# Patient Record
Sex: MALE | Race: OTHER | Employment: FULL TIME | URBAN - METROPOLITAN AREA
[De-identification: names, ages, dates, MRNs, and addresses within clinical notes are randomized per-mention and may not be internally consistent; named-entity substitution may affect disease eponyms.]

---

## 2018-03-26 ENCOUNTER — HOSPITAL ENCOUNTER (OUTPATIENT)
Age: 53
Discharge: HOME | End: 2018-03-26
Payer: COMMERCIAL

## 2018-03-26 LAB
C-PEPTIDE: 2.1 NG/ML (ref 1.1–4.4)
GLUCOSE FASTING: 160 MG/DL (ref 70–99)

## 2018-03-26 PROCEDURE — 84681 ASSAY OF C-PEPTIDE: CPT

## 2018-03-26 PROCEDURE — 82947 ASSAY GLUCOSE BLOOD QUANT: CPT

## 2018-03-26 PROCEDURE — 86341 ISLET CELL ANTIBODY: CPT

## 2018-03-26 PROCEDURE — 36415 COLL VENOUS BLD VENIPUNCTURE: CPT

## 2018-03-28 LAB — ISLET CELL ANTIBODY: NORMAL

## 2024-01-22 ENCOUNTER — HOSPITAL ENCOUNTER (OUTPATIENT)
Age: 59
Setting detail: SPECIMEN
Discharge: HOME | End: 2024-01-22

## 2024-01-22 LAB
BASOPHILS # BLD: 0.07 K/UL (ref 0–0.2)
BASOPHILS NFR BLD: 1 % (ref 0–2)
EOSINOPHIL # BLD: 0.07 K/UL (ref 0–0.44)
EOSINOPHILS RELATIVE PERCENT: 1 % (ref 1–4)
ERYTHROCYTE [DISTWIDTH] IN BLOOD BY AUTOMATED COUNT: 12.6 % (ref 11.8–14.4)
HCT VFR BLD AUTO: 42.1 % (ref 40.7–50.3)
HGB BLD-MCNC: 13.4 G/DL (ref 13–17)
IMM GRANULOCYTES # BLD AUTO: <0.03 K/UL (ref 0–0.3)
IMM GRANULOCYTES NFR BLD: 0 %
LYMPHOCYTES NFR BLD: 2.58 K/UL (ref 1.1–3.7)
LYMPHOCYTES RELATIVE PERCENT: 30 % (ref 24–43)
MCH RBC QN AUTO: 28.4 PG (ref 25.2–33.5)
MCHC RBC AUTO-ENTMCNC: 31.8 G/DL (ref 28.4–34.8)
MCV RBC AUTO: 89.2 FL (ref 82.6–102.9)
MONOCYTES NFR BLD: 0.69 K/UL (ref 0.1–1.2)
MONOCYTES NFR BLD: 8 % (ref 3–12)
NEUTROPHILS NFR BLD: 60 % (ref 36–65)
NEUTS SEG NFR BLD: 5.32 K/UL (ref 1.5–8.1)
NRBC BLD-RTO: 0 PER 100 WBC
PLATELET # BLD AUTO: 286 K/UL (ref 138–453)
PMV BLD AUTO: 10.8 FL (ref 8.1–13.5)
PSA SERPL-MCNC: 1.28 NG/ML
RBC # BLD AUTO: 4.72 M/UL (ref 4.21–5.77)
WBC OTHER # BLD: 8.8 K/UL (ref 3.5–11.3)

## 2024-01-23 LAB
ALBUMIN SERPL-MCNC: 4.5 G/DL (ref 3.5–5.2)
ALBUMIN/GLOB SERPL: 1.5 {RATIO} (ref 1–2.5)
ALP SERPL-CCNC: 97 U/L (ref 40–129)
ALT SERPL-CCNC: 29 U/L (ref 5–41)
ANION GAP SERPL CALCULATED.3IONS-SCNC: 13 MMOL/L (ref 9–17)
AST SERPL-CCNC: 21 U/L
BILIRUB SERPL-MCNC: 0.3 MG/DL (ref 0.3–1.2)
BUN SERPL-MCNC: 17 MG/DL (ref 6–20)
CALCIUM SERPL-MCNC: 9 MG/DL (ref 8.6–10.4)
CHLORIDE SERPL-SCNC: 102 MMOL/L (ref 98–107)
CHOLEST SERPL-MCNC: 129 MG/DL
CHOLESTEROL/HDL RATIO: 3.1
CO2 SERPL-SCNC: 24 MMOL/L (ref 20–31)
CREAT SERPL-MCNC: 0.8 MG/DL (ref 0.7–1.2)
GFR SERPL CREATININE-BSD FRML MDRD: >60 ML/MIN/1.73M2
GLUCOSE SERPL-MCNC: 96 MG/DL (ref 70–99)
HDLC SERPL-MCNC: 41 MG/DL
LDLC SERPL CALC-MCNC: 63 MG/DL (ref 0–130)
POTASSIUM SERPL-SCNC: 4.3 MMOL/L (ref 3.7–5.3)
PROT SERPL-MCNC: 7.5 G/DL (ref 6.4–8.3)
SODIUM SERPL-SCNC: 139 MMOL/L (ref 135–144)
T4 FREE SERPL-MCNC: 1.2 NG/DL (ref 0.9–1.7)
TRIGL SERPL-MCNC: 125 MG/DL
TSH SERPL DL<=0.05 MIU/L-ACNC: 3.57 UIU/ML (ref 0.3–5)
URATE SERPL-MCNC: 5.2 MG/DL (ref 3.4–7)

## 2024-12-02 ENCOUNTER — HOSPITAL ENCOUNTER (OUTPATIENT)
Dept: PHYSICAL THERAPY | Facility: CLINIC | Age: 59
Setting detail: THERAPIES SERIES
Discharge: HOME | End: 2024-12-02
Payer: COMMERCIAL

## 2024-12-02 PROCEDURE — 97110 THERAPEUTIC EXERCISES: CPT

## 2024-12-02 PROCEDURE — 97161 PT EVAL LOW COMPLEX 20 MIN: CPT

## 2024-12-02 PROCEDURE — 97016 VASOPNEUMATIC DEVICE THERAPY: CPT

## 2024-12-02 NOTE — CONSULTS
Billable time 50 min 3        Time in: 8:10 am    Time Out: 9:00 am    Electronically signed by: Manfred Cortez PT        Physician Signature:________________________________Date:__________________  By signing above or cosigning this note, I have reviewed this plan of care and certify a need for medically necessary rehabilitation services.     *PLEASE SIGN ABOVE AND FAX BACK ALL PAGES*

## 2024-12-05 ENCOUNTER — HOSPITAL ENCOUNTER (OUTPATIENT)
Dept: PHYSICAL THERAPY | Facility: CLINIC | Age: 59
Setting detail: THERAPIES SERIES
Discharge: HOME | End: 2024-12-05
Payer: COMMERCIAL

## 2024-12-05 PROCEDURE — 97110 THERAPEUTIC EXERCISES: CPT

## 2024-12-05 NOTE — FLOWSHEET NOTE
[] [facility]  Outpatient Rehabilitation &  Therapy  [address]  [phone]  [phone] [] [facility]  Outpatient Rehabilitation &  Therapy  [address]  P: [phone]  F: [phone] [] [facility]  Outpatient Rehabilitation &  Therapy  [address]  P: [phone]  F: [phone] [x] [facility]  Outpatient Rehabilitation &  Therapy  [address]  [phone]  [phone] [] [facility]  Outpatient Rehabilitation &  Therapy  [address]   P: [phone]  F: [phone]  [] [facility]  Outpatient Rehabilitation &  Therapy  [address]  P: [phone]  F: [phone] [] [facility]  Outpatient Rehabilitation &  Therapy  [address]  P: [phone]  F: [phone] [] [facility]  Outpatient Rehabilitation &  Therapy  [address]  P: [phone]  F: [phone] [] [facility]  Outpatient Rehabilitation &  Therapy  [address]  P: [phone]  F: [phone]  [] [facility] Outpatient Rehabilitation &  Therapy  [address]  P: [phone]  F: [phone]     Physical Therapy Daily Treatment Note    Date:  2024  Patient Name:  Ramon Go    :  1965  MRN: [id number]  Physician: Dominick Harvey MD  Insurance: [facility] Shared Services; Jacob yr; 60/60vs; no auth req; hard max; PT/OT/ST comb; 15% coins; ded met; 6500/3608.50 remaining OOP   Medical Diagnosis: Bilateral shoulder pain, unspecified chronicity [M25.511, M25.512], Rotator cuff impingement syndrome, unspecified laterality [M75.40]                         Rehab Codes: M25. 511, M25.512   Visit# / total visits:    Cancels/No Shows: 0/0    Subjective:  Patient states his shoulders are feeling a little better since taking

## 2024-12-09 ENCOUNTER — HOSPITAL ENCOUNTER (OUTPATIENT)
Dept: PHYSICAL THERAPY | Facility: CLINIC | Age: 59
Setting detail: THERAPIES SERIES
Discharge: HOME | End: 2024-12-09
Payer: COMMERCIAL

## 2024-12-09 PROCEDURE — 97110 THERAPEUTIC EXERCISES: CPT

## 2024-12-09 PROCEDURE — 97016 VASOPNEUMATIC DEVICE THERAPY: CPT

## 2024-12-09 NOTE — FLOWSHEET NOTE
tolerated     STG: (to be met in 8 treatments)  ? Pain: Pt will report resting pain <6/10 for improved QOL.  ? ROM: Pt will improve flexion ROM to 150 to improve overhead reaching with ADLs.   ? Strength: Pt will improve strength to at least 4/5 to improve strength for recreational activities.   ? Function: Pt will improve UEFI by at least 4 points   Patient to be independent with home exercise program as demonstrated by performance with correct form without cues.  LTG: (to be met in 12 treatments)  Pt will improve ROM to 160 to improve overhead reaching with ADLs.   Pt will report pain <6/10 with previously painful movements.   Pt will improve UEFI by at least 9 points to improve QOL.                 Patient goals: \"relieve pain\"    Pt. Education:  [x] Yes  [] No  [x] Reviewed Prior HEP/Ed  Method of Education: [x] Verbal  [x] Demo  [x] Written  Comprehension of Education:  [x] Verbalizes understanding.  [x] Demonstrates understanding.  [x] Needs review.  [] Demonstrates/verbalizes HEP/Ed previously given.     Access Code: [email or web address]  URL: [email or web address]  Date: 12/09/2024  Prepared by: Manfred Cortez    Exercises  - Seated Scapular Retraction  - 1 x daily - 5 x weekly - 2 sets - 10 reps  - Sidelying Shoulder External Rotation  - 1 x daily - 5 x weekly - 2 sets - 10 reps  - Sidelying Shoulder Abduction Palm Forward  - 1 x daily - 5 x weekly - 2 sets - 10 reps  - Supine Shoulder Flexion Extension AAROM with Dowel  - 1 x daily - 5 x weekly - 2 sets - 10 reps  - Standing Shoulder Flexion Wall Walk  - 1 x daily - 5 x weekly - 2 sets - 10 reps  - Standing Shoulder Row with Anchored Resistance  - 1 x daily - 5 x weekly - 2 sets - 10 reps  - Shoulder extension with resistance - Neutral  - 1 x daily - 5 x weekly - 2 sets - 10 reps     Plan: [x] Continue current frequency toward long and short term goals.    [x] Specific Instructions for subsequent treatments: review HEP, progress ROM and strength as

## 2024-12-12 ENCOUNTER — HOSPITAL ENCOUNTER (OUTPATIENT)
Dept: PHYSICAL THERAPY | Facility: CLINIC | Age: 59
Setting detail: THERAPIES SERIES
Discharge: HOME | End: 2024-12-12
Payer: COMMERCIAL

## 2024-12-12 PROCEDURE — 97110 THERAPEUTIC EXERCISES: CPT

## 2024-12-12 NOTE — FLOWSHEET NOTE
QOL.  ? ROM: Pt will improve flexion ROM to 150 to improve overhead reaching with ADLs.   ? Strength: Pt will improve strength to at least 4/5 to improve strength for recreational activities.   ? Function: Pt will improve UEFI by at least 4 points   Patient to be independent with home exercise program as demonstrated by performance with correct form without cues.  LTG: (to be met in 12 treatments)  Pt will improve ROM to 160 to improve overhead reaching with ADLs.   Pt will report pain <6/10 with previously painful movements.   Pt will improve UEFI by at least 9 points to improve QOL.                 Patient goals: \"relieve pain\"    Pt. Education:  [x] Yes  [] No  [x] Reviewed Prior HEP/Ed  Method of Education: [x] Verbal  [x] Demo  [x] Written  Comprehension of Education:  [x] Verbalizes understanding.  [x] Demonstrates understanding.  [x] Needs review.  [] Demonstrates/verbalizes HEP/Ed previously given.     Access Code: [email or web address]  URL: [email or web address]  Date: 12/09/2024  Prepared by: Manfred Machado-    Exercises  - Seated Scapular Retraction  - 1 x daily - 5 x weekly - 2 sets - 10 reps  - Sidelying Shoulder External Rotation  - 1 x daily - 5 x weekly - 2 sets - 10 reps  - Sidelying Shoulder Abduction Palm Forward  - 1 x daily - 5 x weekly - 2 sets - 10 reps  - Supine Shoulder Flexion Extension AAROM with Dowel  - 1 x daily - 5 x weekly - 2 sets - 10 reps  - Standing Shoulder Flexion Wall Walk  - 1 x daily - 5 x weekly - 2 sets - 10 reps  - Standing Shoulder Row with Anchored Resistance  - 1 x daily - 5 x weekly - 2 sets - 10 reps  - Shoulder extension with resistance - Neutral  - 1 x daily - 5 x weekly - 2 sets - 10 reps     Plan: [x] Continue current frequency toward long and short term goals.    [x] Specific Instructions for subsequent treatments: update HEP      Frequency: 2 x/week for 12 visits     Time In: 11:55 am             Time Out: 1:00 pm    Electronically signed by:  Manfred

## 2024-12-16 ENCOUNTER — HOSPITAL ENCOUNTER (OUTPATIENT)
Dept: PHYSICAL THERAPY | Facility: CLINIC | Age: 59
Setting detail: THERAPIES SERIES
Discharge: HOME | End: 2024-12-16
Payer: COMMERCIAL

## 2024-12-16 PROCEDURE — 97110 THERAPEUTIC EXERCISES: CPT

## 2024-12-16 NOTE — FLOWSHEET NOTE
[x] Yes  [] No Location: bilateral shoulder  Pain Rating: (0-10 scale) /10    4/10 right  3/10 left   Pain altered Tx:  [x] No  [] Yes  Action:  Comments:     Objective:  Modalities:   Precautions [x] No  [] Yes:  Exercises:  Exercise Reps/ Time Weight/ Level Comments Completed    UBE 2x2'   x   SL ER 2x10     x   SL abduction  2x10     x   SL horzional abd  2x10   x   SL flexion  2x10   -          Pulleys  2x10    Flexion/Scaption  -          Seated scap retraction 2x10        Towel on wall  30x ea    up/down, L/R, circles -   Bwd shoulder rolls  10x      Ball up wall  10x ea  Fwd/V x   SL open book 15x   x   A, T 10x  Pain with T - withhold  x   Y lift at wall  10x2   x   Tband rows  10x2 Blue    x   Tband shoulder extension  10x2 Blue     x   Tband IR  10x Orange   With towel roll  -   TB ER iso hold  [id number]\"  Orange loop  -     Other:    Treatment Charges: Mins Units   []  Modalities     [x]  Ther Exercise 55 4   []  Neuromuscular Re-ed     []  Gait Training     []  Manual Therapy     []  Ther Activities     []  Aquatics     []  Vasocompression     []  Cervical Traction     []  Other     Total Billable time 55 4      Assessment: [x] Progressing toward goals. Pt has good tolerance with exercises listed. Added in SL open book rotation after subjective report of neck/upper back stiffness. Updated HEP as listed with blue TB provided. Pt denied need for vaso at end of session.     [] No change.     [] Other:   [x] Patient would continue to benefit from skilled physical therapy services in order to: review HEP, progress ROM and strength as tolerated     STG: (to be met in 8 treatments)  ? Pain: Pt will report resting pain <6/10 for improved QOL.  ? ROM: Pt will improve flexion ROM to 150 to improve overhead reaching with ADLs.   ? Strength: Pt will improve strength to at least 4/5 to improve strength for recreational activities.   ? Function: Pt will improve UEFI by at least 4 points   Patient to be independent with

## 2024-12-19 ENCOUNTER — APPOINTMENT (OUTPATIENT)
Dept: PHYSICAL THERAPY | Facility: CLINIC | Age: 59
End: 2024-12-19
Payer: COMMERCIAL

## 2024-12-20 ENCOUNTER — HOSPITAL ENCOUNTER (OUTPATIENT)
Dept: PHYSICAL THERAPY | Facility: CLINIC | Age: 59
Setting detail: THERAPIES SERIES
Discharge: HOME | End: 2024-12-20
Payer: COMMERCIAL

## 2024-12-20 PROCEDURE — 97110 THERAPEUTIC EXERCISES: CPT

## 2024-12-20 NOTE — FLOWSHEET NOTE
[] [facility]  Outpatient Rehabilitation &  Therapy  [address]  [phone]  [phone] [] [facility]  Outpatient Rehabilitation &  Therapy  [address]  P: [phone]  F: [phone] [] [facility]  Outpatient Rehabilitation &  Therapy  [address]  P: [phone]  F: [phone] [x] [facility]  Outpatient Rehabilitation &  Therapy  [address]  [phone]  [phone] [] [facility]  Outpatient Rehabilitation &  Therapy  [address]   P: [phone]  F: [phone]  [] [facility]  Outpatient Rehabilitation &  Therapy  [address]  P: [phone]  F: [phone] [] [facility]  Outpatient Rehabilitation &  Therapy  [address]  P: [phone]  F: [phone] [] [facility]  Outpatient Rehabilitation &  Therapy  [address]  P: [phone]  F: [phone] [] [facility]  Outpatient Rehabilitation &  Therapy  [address]  P: [phone]  F: [phone]  [] [facility] Outpatient Rehabilitation &  Therapy  [address]  P: [phone]  F: [phone]     Physical Therapy Daily Treatment Note    Date:  2024  Patient Name:  Ramon Go    :  1965  MRN: [id number]  Physician: Dominick Harvey MD  Insurance: [facility] Shared Services; Jacob yr; 60/60vs; no auth req; hard max; PT/OT/ST comb; 15% coins; ded met; 6500/3608.50 remaining OOP   Medical Diagnosis: Bilateral shoulder pain, unspecified chronicity [M25.511, M25.512], Rotator cuff impingement syndrome, unspecified laterality [M75.40]                         Rehab Codes: M25. 511, M25.512   Visit# / total visits:    Cancels/No Shows: 0/0    Subjective:  Pt states his shoulders are feeling good today - was lifting heavy

## 2024-12-23 ENCOUNTER — HOSPITAL ENCOUNTER (OUTPATIENT)
Dept: PHYSICAL THERAPY | Facility: CLINIC | Age: 59
Setting detail: THERAPIES SERIES
Discharge: HOME | End: 2024-12-23
Payer: COMMERCIAL

## 2024-12-23 PROCEDURE — 97110 THERAPEUTIC EXERCISES: CPT

## 2024-12-23 NOTE — FLOWSHEET NOTE
Instructions for subsequent treatments: update HEP      Frequency: 2 x/week for 12 visits     Time In:  10:05 am   (pt arrival time)      Time Out: 10:58 am    Electronically signed by:  Manfred Cortez PT     
alert

## 2024-12-26 ENCOUNTER — HOSPITAL ENCOUNTER (OUTPATIENT)
Dept: PHYSICAL THERAPY | Facility: CLINIC | Age: 59
Setting detail: THERAPIES SERIES
Discharge: HOME | End: 2024-12-26
Payer: COMMERCIAL

## 2024-12-26 PROCEDURE — 97110 THERAPEUTIC EXERCISES: CPT

## 2024-12-26 PROCEDURE — 97140 MANUAL THERAPY 1/> REGIONS: CPT

## 2024-12-26 NOTE — FLOWSHEET NOTE
[] [facility]  Outpatient Rehabilitation &  Therapy  [address]  [phone]  [phone] [] [facility]  Outpatient Rehabilitation &  Therapy  [address]  P: [phone]  F: [phone] [] [facility]  Outpatient Rehabilitation &  Therapy  [address]  P: [phone]  F: [phone] [x] [facility]  Outpatient Rehabilitation &  Therapy  [address]  [phone]  [phone] [] [facility]  Outpatient Rehabilitation &  Therapy  [address]   P: [phone]  F: [phone]  [] [facility]  Outpatient Rehabilitation &  Therapy  [address]  P: [phone]  F: [phone] [] [facility]  Outpatient Rehabilitation &  Therapy  [address]  P: [phone]  F: [phone] [] [facility]  Outpatient Rehabilitation &  Therapy  [address]  P: [phone]  F: [phone] [] [facility]  Outpatient Rehabilitation &  Therapy  [address]  P: [phone]  F: [phone]  [] [facility] Outpatient Rehabilitation &  Therapy  [address]  P: [phone]  F: [phone]     Physical Therapy Daily Treatment Note    Date:  2024  Patient Name:  Ramon Go    :  1965  MRN: [id number]  Physician: Dominick Harvey MD  Insurance: [facility] Shared Services; Jacob yr; 60/60vs; no auth req; hard max; PT/OT/ST comb; 15% coins; ded met; 6500/3608.50 remaining OOP   Medical Diagnosis: Bilateral shoulder pain, unspecified chronicity [M25.511, M25.512], Rotator cuff impingement syndrome, unspecified laterality [M75.40]                         Rehab Codes: M25. 511, M25.512   Visit# / total visits:     Cancels/No Shows: 0/0    Subjective:  Pt reports still sleeping better - otherwise pain is maintaining in

## 2024-12-30 ENCOUNTER — HOSPITAL ENCOUNTER (OUTPATIENT)
Dept: PHYSICAL THERAPY | Facility: CLINIC | Age: 59
Setting detail: THERAPIES SERIES
Discharge: HOME | End: 2024-12-30
Payer: COMMERCIAL

## 2024-12-30 PROCEDURE — 97110 THERAPEUTIC EXERCISES: CPT

## 2024-12-30 NOTE — FLOWSHEET NOTE
to lift him off the floor - feeling increased soreness in his shoulders today.    Pain:  [x] Yes  [] No Location: bilateral shoulder  Pain Rating: (0-10 scale) /10    4/10 right  3/10 left   Pain altered Tx:  [x] No  [] Yes  Action:  Comments:     Objective:  Modalities:   Precautions [x] No  [] Yes:  Exercises:  Exercise Reps/ Time Weight/ Level Comments Completed    UBE 2x2'  NC x   SL ER x10 1#   -   SL abduction  x10 1#   -   SL horzional abd  x10 1#  -   SL flexion  2x10   -          Pulleys  2x10    Flexion/Scaption  -   [place]  1x10 Blue   -   Seated scap retraction 2x10        Towel on wall  30x ea    up/down, L/R, circles -   Standing ER  2x10 lime  x   Ball up wall  10x ea  Soccer ball -fwd/V x   Star  2x10 Blue   -   SL open book 15x   -   A 10x2 1#  -   Y lift at wall  10x2   -   Tband rows  2x15 Blue    x   Tband shoulder extension  2x15 Blue     x   TB ER iso hold  10x10\"  lime loop  x   Abduction/flexion  2x10 2#  x   Tricep extension  2x15 Lime   x     Other:    UEFI: 59/80 - previous 61/80  MMT: 4/5 abduction/flexion/ER/IR for R/L     Objective:               ROM  °A/P END FEEL STRENGTH     Left Right   Left Right   Sit Shld Flex 130/140 135/145   4+* 4+*   Sit Shld Abd 150 140   4+* 4+*   Sit Shld IR T12 L5         Shoulder Flex             Ext             ABD             ER        4* 4*   IR       4* 4*       Treatment Charges: Mins Units   []  Modalities     [x]  Ther Exercise 38 3   []  Neuromuscular Re-ed     []  Gait Training     []  Manual Therapy     []  Ther Activities     []  Aquatics     []  Vasocompression     []  Cervical Traction     []  Other     Total Billable time 38 3      Assessment: [] Progressing toward goals.     [] No change.     [x] Other: Pt has fair-good tolerance with listed exercises - increased soreness noted this date secondary to heavy lifting this past weekend. Updated goals with pt demonstrating min objective improvement. Education provided to pt regarding plan

## 2025-01-02 ENCOUNTER — HOSPITAL ENCOUNTER (OUTPATIENT)
Dept: PHYSICAL THERAPY | Facility: CLINIC | Age: 60
Setting detail: THERAPIES SERIES
Discharge: HOME | End: 2025-01-02
Payer: COMMERCIAL

## 2025-01-02 PROCEDURE — 97110 THERAPEUTIC EXERCISES: CPT

## 2025-01-02 NOTE — FLOWSHEET NOTE
[] [facility]  Outpatient Rehabilitation &  Therapy  [address]  [phone]  [phone] [] [facility]  Outpatient Rehabilitation &  Therapy  [address]  P: [phone]  F: [phone] [] [facility]  Outpatient Rehabilitation &  Therapy  [address]  P: [phone]  F: [phone] [x] [facility]  Outpatient Rehabilitation &  Therapy  [address]  [phone]  [phone] [] [facility]  Outpatient Rehabilitation &  Therapy  [address]   P: [phone]  F: [phone]  [] [facility]  Outpatient Rehabilitation &  Therapy  [address]  P: [phone]  F: [phone] [] [facility]  Outpatient Rehabilitation &  Therapy  [address]  P: [phone]  F: [phone] [] [facility]  Outpatient Rehabilitation &  Therapy  [address]  P: [phone]  F: [phone] [] [facility]  Outpatient Rehabilitation &  Therapy  [address]  P: [phone]  F: [phone]  [] [facility] Outpatient Rehabilitation &  Therapy  [address]  P: [phone]  F: [phone]     Physical Therapy Daily Treatment Note    Date:  2025  Patient Name:  Ramon Go    :  1965  MRN: [id number]  Physician: Dominick Harvey MD  Insurance: [facility] Shared Services; Jacob yr; 60/60vs; no auth req; hard max; PT/OT/ST comb; 15% coins; ded met; 6500/3608.50 remaining OOP   Medical Diagnosis: Bilateral shoulder pain, unspecified chronicity [M25.511, M25.512], Rotator cuff impingement syndrome, unspecified laterality [M75.40]                         Rehab Codes: M25. 511, M25.512   Visit# / total visits: 10/12    Cancels/No Shows: 0/0    Subjective:  Pt states he has an appointment with ortho tomorrow at 9:30 am -states

## 2025-01-03 ENCOUNTER — OFFICE VISIT (OUTPATIENT)
Dept: ORTHOPEDIC SURGERY | Age: 60
End: 2025-01-03

## 2025-01-03 VITALS — HEIGHT: 68 IN | WEIGHT: 222 LBS | RESPIRATION RATE: 14 BRPM | BODY MASS INDEX: 33.65 KG/M2

## 2025-01-03 DIAGNOSIS — M75.82 TENDINITIS OF BOTH ROTATOR CUFFS: Primary | ICD-10-CM

## 2025-01-03 DIAGNOSIS — M25.512 BILATERAL SHOULDER PAIN, UNSPECIFIED CHRONICITY: ICD-10-CM

## 2025-01-03 DIAGNOSIS — M75.81 TENDINITIS OF BOTH ROTATOR CUFFS: Primary | ICD-10-CM

## 2025-01-03 DIAGNOSIS — M25.511 BILATERAL SHOULDER PAIN, UNSPECIFIED CHRONICITY: ICD-10-CM

## 2025-01-03 DIAGNOSIS — M75.21 BILATERAL BICEPS TENDINITIS: ICD-10-CM

## 2025-01-03 DIAGNOSIS — M75.22 BILATERAL BICEPS TENDINITIS: ICD-10-CM

## 2025-01-03 RX ORDER — DICLOFENAC SODIUM 75 MG/1
75 TABLET, DELAYED RELEASE ORAL 2 TIMES DAILY WITH MEALS
Qty: 28 TABLET | Refills: 0 | Status: SHIPPED | OUTPATIENT
Start: 2025-01-03 | End: 2025-01-17

## 2025-01-03 NOTE — PROGRESS NOTES
Orthopedic Shoulder Encounter Note     Chief complaint: bilateral shoulder pain    HPI: Ramon Go is a 59 y.o.  right-hand dominant male who presents for evaluation of both his shoulders which have been hurting for the past 3 to 4 months.  No precipitating trauma or injury.  He essentially woke up with soreness in his shoulders.  The right hurts more than the left.  He initially thought that his pain was due to having slept in an awkward position but it has persisted.  He is unable to raise the right arm above shoulder level without being in pain.  He describes having a constant toothache like pain in his shoulder with or without activity.  He also describes having easy fatigability.  He has been treated thus far with some physical therapy and states that the shoulder is usually feel better for a day after therapy.  He is also had cortisone injections administered by his PCP providing pain relief lasting about a week.    Previous treatment:    NSAIDs: None    Physical Therapy: Yes, 10 visits    Injections: Bilateral shoulder cortisone injections.  Right shoulder 11/11/2024 and left shoulder 11/18/2024 by his PCP    Surgeries: None    Review of Systems:   Constitutional: Negative for fever, chills, sweats.   Pain Score:   4  Neurological: Negative for headache, numbness, or weakness.   Musculoskeletal: As noted in HPI     Past Medical History  Ramon  has no past medical history on file.    Past Surgical History  Ramon  has no past surgical history on file.    Current Medications  No current outpatient medications on file.     No current facility-administered medications for this visit.       Allergies  Allergies have been reviewed.  Ramon has No Known Allergies.    Social History  Ramon  reports that he has never smoked. He has never used smokeless tobacco. He reports that he does not currently use alcohol. He reports that he does not use drugs.    Family History  Ramon's family history is not on file.

## 2025-01-06 ENCOUNTER — APPOINTMENT (OUTPATIENT)
Dept: PHYSICAL THERAPY | Facility: CLINIC | Age: 60
End: 2025-01-06
Payer: COMMERCIAL

## 2025-01-08 ENCOUNTER — HOSPITAL ENCOUNTER (OUTPATIENT)
Dept: INTERVENTIONAL RADIOLOGY/VASCULAR | Age: 60
Discharge: HOME | End: 2025-01-10
Payer: COMMERCIAL

## 2025-01-08 DIAGNOSIS — M75.21 BILATERAL BICEPS TENDINITIS: ICD-10-CM

## 2025-01-08 DIAGNOSIS — M75.22 BILATERAL BICEPS TENDINITIS: ICD-10-CM

## 2025-01-08 DIAGNOSIS — M75.21 BICIPITAL TENDINITIS OF RIGHT SHOULDER: ICD-10-CM

## 2025-01-08 DIAGNOSIS — M75.82 TENDINITIS OF BOTH ROTATOR CUFFS: ICD-10-CM

## 2025-01-08 DIAGNOSIS — M75.81 TENDINITIS OF BOTH ROTATOR CUFFS: ICD-10-CM

## 2025-01-08 PROCEDURE — 20550 NJX 1 TENDON SHEATH/LIGAMENT: CPT

## 2025-01-08 PROCEDURE — 6360000002 HC RX W HCPCS: Performed by: ORTHOPAEDIC SURGERY

## 2025-01-08 PROCEDURE — 2709999900 IR ARTHR/ASP/INJ INT JT/BURSA W US

## 2025-01-08 RX ORDER — LIDOCAINE HYDROCHLORIDE 10 MG/ML
2 INJECTION, SOLUTION EPIDURAL; INFILTRATION; INTRACAUDAL; PERINEURAL ONCE
Status: COMPLETED | OUTPATIENT
Start: 2025-01-08 | End: 2025-01-08

## 2025-01-08 RX ORDER — TRIAMCINOLONE ACETONIDE 40 MG/ML
40 INJECTION, SUSPENSION INTRA-ARTICULAR; INTRAMUSCULAR ONCE
Status: COMPLETED | OUTPATIENT
Start: 2025-01-08 | End: 2025-01-08

## 2025-01-08 RX ADMIN — LIDOCAINE HYDROCHLORIDE 2 ML: 10 INJECTION, SOLUTION EPIDURAL; INFILTRATION; INTRACAUDAL; PERINEURAL at 15:31

## 2025-01-08 RX ADMIN — LIDOCAINE HYDROCHLORIDE 2 ML: 10 INJECTION, SOLUTION EPIDURAL; INFILTRATION; INTRACAUDAL; PERINEURAL at 15:38

## 2025-01-08 RX ADMIN — TRIAMCINOLONE ACETONIDE 40 MG: 40 INJECTION, SUSPENSION INTRA-ARTICULAR; INTRAMUSCULAR at 15:31

## 2025-01-08 RX ADMIN — TRIAMCINOLONE ACETONIDE 40 MG: 40 INJECTION, SUSPENSION INTRA-ARTICULAR; INTRAMUSCULAR at 15:38

## 2025-01-08 NOTE — PROGRESS NOTES
Patient tolerated bilateral biceps tendon sheath injection without distress. Dressing to site. Discharge instructions given, no questions at this time. Patient discharged home via private auto.

## 2025-01-09 ENCOUNTER — APPOINTMENT (OUTPATIENT)
Dept: PHYSICAL THERAPY | Facility: CLINIC | Age: 60
End: 2025-01-09
Payer: COMMERCIAL

## 2025-01-16 ENCOUNTER — HOSPITAL ENCOUNTER (OUTPATIENT)
Dept: PHYSICAL THERAPY | Facility: CLINIC | Age: 60
Setting detail: THERAPIES SERIES
Discharge: HOME | End: 2025-01-16
Payer: COMMERCIAL

## 2025-01-16 PROCEDURE — 97110 THERAPEUTIC EXERCISES: CPT

## 2025-01-16 PROCEDURE — 97016 VASOPNEUMATIC DEVICE THERAPY: CPT

## 2025-01-16 NOTE — FLOWSHEET NOTE
[] [facility]  Outpatient Rehabilitation &  Therapy  [address]  [phone]  [phone] [] [facility]  Outpatient Rehabilitation &  Therapy  [address]  P: [phone]  F: [phone] [] [facility]  Outpatient Rehabilitation &  Therapy  [address]  P: [phone]  F: [phone] [x] [facility]  Outpatient Rehabilitation &  Therapy  [address]  [phone]  [phone] [] [facility]  Outpatient Rehabilitation &  Therapy  [address]   P: [phone]  F: [phone]  [] [facility]  Outpatient Rehabilitation &  Therapy  [address]  P: [phone]  F: [phone] [] [facility]  Outpatient Rehabilitation &  Therapy  [address]  P: [phone]  F: [phone] [] [facility]  Outpatient Rehabilitation &  Therapy  [address]  P: [phone]  F: [phone] [] [facility]  Outpatient Rehabilitation &  Therapy  [address]  P: [phone]  F: [phone]  [] [facility] Outpatient Rehabilitation &  Therapy  [address]  P: [phone]  F: [phone]     Physical Therapy Daily Treatment Note    Date:  2025  Patient Name:  Ramon Go    :  1965  MRN: [id number]  Physician: Dominick Harvey MD  Insurance: : [facility] Shared Services; Jacob yr; 60/60vs; no auth req; hard max; PT/OT/ST comb; 15% coins; 450/0 met ded; 6500/0 met OOP   Medical Diagnosis: Bilateral shoulder pain, unspecified chronicity [M25.511, M25.512], Rotator cuff impingement syndrome, unspecified laterality [M75.40]                         Rehab Codes: M25. 511, M25.512   Visit# / total visits: 10/18   Cancels/No Shows: 0/0    Subjective: Pt states he saw dr. Overton and was told to continue with therapy at

## 2025-01-20 ENCOUNTER — HOSPITAL ENCOUNTER (OUTPATIENT)
Dept: PHYSICAL THERAPY | Facility: CLINIC | Age: 60
Setting detail: THERAPIES SERIES
Discharge: HOME | End: 2025-01-20
Payer: COMMERCIAL

## 2025-01-20 NOTE — FLOWSHEET NOTE
[] [facility]  Outpatient Rehabilitation &  Therapy  [address]  [phone]  [phone] [] [facility]  Outpatient Rehabilitation &  Therapy  [address]  P: [phone]  F: [phone] [] [facility]  Outpatient Rehabilitation &  Therapy  [address]  P: [phone]  F: [phone] [] [facility]  Outpatient Rehabilitation &  Therapy  [address]  [phone]  [phone] [] [facility]  Outpatient Rehabilitation &  Therapy  [address]   P: [phone]  F: [phone]  [] [facility]  Outpatient Rehabilitation &  Therapy  [address]  P: [phone]  F: [phone] [] [facility]  Outpatient Rehabilitation &  Therapy  [address]  P: [phone]  F: [phone] [] [facility]  Outpatient Rehabilitation &  Therapy  [address]  P: [phone]  F: [phone] [] [facility]  Outpatient Rehabilitation &  Therapy  [address]  P: [phone]  F: [phone]  [] [facility] Outpatient Rehabilitation &  Therapy  [address]  P: [phone]  F: [phone]     Therapy Cancel/No Show note    Date: 2025  Patient: Ramon Go  : 1965  MRN: [id number]    Cancels/No Shows to date: 0/0    For today's appointment patient:    [x]  Cancelled    [] Rescheduled appointment    [] No-show     Reason given by patient:    [x]  Patient ill    []  Conflicting appointment    [] No transportation      [] Conflict with work    [] No reason given    [] Weather related    [] COVID-19    [] Other:      Comments:        [x] Next appointment was confirmed    Electronically signed by: Jonathan Brooks PTA

## 2025-01-23 ENCOUNTER — HOSPITAL ENCOUNTER (OUTPATIENT)
Dept: PHYSICAL THERAPY | Facility: CLINIC | Age: 60
Setting detail: THERAPIES SERIES
Discharge: HOME | End: 2025-01-23
Payer: COMMERCIAL

## 2025-01-23 PROCEDURE — 97110 THERAPEUTIC EXERCISES: CPT

## 2025-01-23 NOTE — FLOWSHEET NOTE
[] [facility]  Outpatient Rehabilitation &  Therapy  [address]  [phone]  [phone] [] [facility]  Outpatient Rehabilitation &  Therapy  [address]  P: [phone]  F: [phone] [] [facility]  Outpatient Rehabilitation &  Therapy  [address]  P: [phone]  F: [phone] [x] [facility]  Outpatient Rehabilitation &  Therapy  [address]  [phone]  [phone] [] [facility]  Outpatient Rehabilitation &  Therapy  [address]   P: [phone]  F: [phone]  [] [facility]  Outpatient Rehabilitation &  Therapy  [address]  P: [phone]  F: [phone] [] [facility]  Outpatient Rehabilitation &  Therapy  [address]  P: [phone]  F: [phone] [] [facility]  Outpatient Rehabilitation &  Therapy  [address]  P: [phone]  F: [phone] [] [facility]  Outpatient Rehabilitation &  Therapy  [address]  P: [phone]  F: [phone]  [] [facility] Outpatient Rehabilitation &  Therapy  [address]  P: [phone]  F: [phone]     Physical Therapy Daily Treatment Note    Date:  2025  Patient Name:  Ramon Go    :  1965  MRN: [id number]  Physician: Dominick Harvey MD  Insurance: : [facility] [employer]; Jacob yr; 60/60vs; no auth req; hard max; PT/OT/ST comb; 15% coins; 450/0 met ded; 6500/0 met OOP   Medical Diagnosis: Bilateral shoulder pain, unspecified chronicity [M25.511, M25.512], Rotator cuff impingement syndrome, unspecified laterality [M75.40]                         Rehab Codes: M25. 511, M25.512   Visit# / total visits:    Cancels/No Shows: 0/0    Subjective: Pt states no pain, feeling better from being sick.   Pain:  [] Yes  [x]

## 2025-01-27 ENCOUNTER — HOSPITAL ENCOUNTER (OUTPATIENT)
Dept: PHYSICAL THERAPY | Facility: CLINIC | Age: 60
Setting detail: THERAPIES SERIES
Discharge: HOME | End: 2025-01-27
Payer: COMMERCIAL

## 2025-01-27 PROCEDURE — 97110 THERAPEUTIC EXERCISES: CPT

## 2025-01-27 NOTE — FLOWSHEET NOTE
[] [facility]  Outpatient Rehabilitation &  Therapy  [address]  [phone]  [phone] [] [facility]  Outpatient Rehabilitation &  Therapy  [address]  P: [phone]  F: [phone] [] [facility]  Outpatient Rehabilitation &  Therapy  [address]  P: [phone]  F: [phone] [x] [facility]  Outpatient Rehabilitation &  Therapy  [address]  [phone]  [phone] [] [facility]  Outpatient Rehabilitation &  Therapy  [address]   P: [phone]  F: [phone]  [] [facility]  Outpatient Rehabilitation &  Therapy  [address]  P: [phone]  F: [phone] [] [facility]  Outpatient Rehabilitation &  Therapy  [address]  P: [phone]  F: [phone] [] [facility]  Outpatient Rehabilitation &  Therapy  [address]  P: [phone]  F: [phone] [] [facility]  Outpatient Rehabilitation &  Therapy  [address]  P: [phone]  F: [phone]  [] [facility] Outpatient Rehabilitation &  Therapy  [address]  P: [phone]  F: [phone]     Physical Therapy Daily Treatment Note    Date:  2025  Patient Name:  Ramon Go    :  1965  MRN: [id number]  Physician: Dominick Harvey MD  Insurance: : [facility] [employer]; Jacob yr; 60/60vs; no auth req; hard max; PT/OT/ST comb; 15% coins; 450/0 met ded; 6500/0 met OOP   Medical Diagnosis: Bilateral shoulder pain, unspecified chronicity [M25.511, M25.512], Rotator cuff impingement syndrome, unspecified laterality [M75.40]                         Rehab Codes: M25. 511, M25.512   Visit# / total visits:    Cancels/No Shows: 0/0    Subjective: Pt states he is doing well today - is okay with it being the last day.